# Patient Record
Sex: FEMALE | Race: WHITE
[De-identification: names, ages, dates, MRNs, and addresses within clinical notes are randomized per-mention and may not be internally consistent; named-entity substitution may affect disease eponyms.]

---

## 2023-06-20 PROBLEM — Z00.00 ENCOUNTER FOR PREVENTIVE HEALTH EXAMINATION: Status: ACTIVE | Noted: 2023-06-20

## 2023-06-29 ENCOUNTER — APPOINTMENT (OUTPATIENT)
Dept: UROLOGY | Facility: CLINIC | Age: 28
End: 2023-06-29
Payer: COMMERCIAL

## 2023-06-29 VITALS
RESPIRATION RATE: 16 BRPM | SYSTOLIC BLOOD PRESSURE: 101 MMHG | BODY MASS INDEX: 26.68 KG/M2 | HEART RATE: 90 BPM | HEIGHT: 67 IN | OXYGEN SATURATION: 97 % | WEIGHT: 170 LBS | DIASTOLIC BLOOD PRESSURE: 70 MMHG

## 2023-06-29 DIAGNOSIS — N39.0 URINARY TRACT INFECTION, SITE NOT SPECIFIED: ICD-10-CM

## 2023-06-29 PROCEDURE — 51798 US URINE CAPACITY MEASURE: CPT

## 2023-06-29 PROCEDURE — 99204 OFFICE O/P NEW MOD 45 MIN: CPT | Mod: 25

## 2023-06-29 NOTE — PHYSICAL EXAM
[General Appearance - Well Developed] : well developed [General Appearance - Well Nourished] : well nourished [Normal Appearance] : normal appearance [Well Groomed] : well groomed [General Appearance - In No Acute Distress] : no acute distress [] : no respiratory distress [Respiration, Rhythm And Depth] : normal respiratory rhythm and effort [Exaggerated Use Of Accessory Muscles For Inspiration] : no accessory muscle use [Abdomen Soft] : soft [Abdomen Tenderness] : non-tender [Costovertebral Angle Tenderness] : no ~M costovertebral angle tenderness [Urethral Meatus] : normal urethra [External Female Genitalia] : normal external genitalia [Vagina] : normal vaginal exam [Normal Station and Gait] : the gait and station were normal for the patient's age [FreeTextEntry1] : + point tenderness pelvic floor

## 2023-06-29 NOTE — ASSESSMENT
[FreeTextEntry1] : Recurrent UTI is defined as two episodes of acute bacterial cystitis within six months or three episodes within one year. Patient seems to meets criteria, but no culture data here given recently moved from . Exclusively post-coital in nature. We discussed the natural history of UTIs and strategies to prevent incidence of UTIs. We discussed the data related to increase water intake and cranberry extract daily. We discussed antibiotic prophylaxis both continuous and post-coital. Patient was on post-coital years ago and helped significantly even after completed course. Recent increase in episodes - since moved had 3 episodes but no cultures.\par \par - UA, urine culture (ensure no microhematuria given history with UTIs)\par - importance of culture data discussed - will call with symptoms\par - increase water, cranberry extract, probiotics, pt and partner showering prior to intercourse, urinating after\par - if subsequent culture proven UTI will initiate post-coital prophylaxis\par - f/u in 3 months\par - patient reports family history brain tumor and is interested in genetic counseling - will email genetics team

## 2023-06-29 NOTE — HISTORY OF PRESENT ILLNESS
[FreeTextEntry1] : Name Colleen Cardenas \par MRN [id number] \par  1995 \par Contact Number: [phone]\par -----------------------------------------------------------------------------------------------------------------------------------------\par Date of First Visit: 23\par Referring Provider/PCP: none\par -----------------------------------------------------------------------------------------------------------------------------------------\par \par CC: recurrent UTIs\par \par History of Present Illness: Colleen Cardenas is a 28 year old female who presents for evaluation recurrent UTI. Patient recently moved from  in February.\par \par Patient reports ever since she became sexually active she started getting post-coital UTIs. In the [place], every time had symptoms she had urine tested and would only be able to get antibiotics if culture positive. Gets UTIs every couple of months, but that is because she avoids sexual intercourse. In  patient was placed on prophylaxis which improved incidence, then things improved and has been off for past 3 years. Over the last year has had worse symptoms. UTIs are essentially exclusively post-coital in nature.\par \par Since moved in February she had 3 episodes of symptoms - [facility] no culture sent and was given antibiotics - nitrofurantoin. One month ago last episode. Symptoms usually resolve with antibiotics. Not currently having symptoms.\par \par Typical symptoms: dysuria, hematuria, suprapubic pain, frequency. No history of fevers, no history of flank pain, no history of stones. No known history MDR organisms. No prior  surgeries.\par \par Prior treatments: drinks 8 cups water a day, cranberry extract many years ago but only once developed symptoms, antibiotic prophylaxis, urinating after intercourse.\par \par Baseline genitourinary symptoms between infections - no issues.\par \par \par \par PVR 0\par \par PMH: none\par PSH: none\par Family History: mother with brain tumor, passed at 40\par Social: , no children, recently moved from , works in [employer], never smoker, 7 drinks/week, no recreational drugs\par Meds: birth control\par Allergies: NKDA\par ROS: no dysuria, no fever or chills

## 2023-06-30 LAB
APPEARANCE: CLEAR
BACTERIA: NEGATIVE /HPF
BILIRUBIN URINE: NEGATIVE
BLOOD URINE: NEGATIVE
CAST: 0 /LPF
COLOR: YELLOW
EPITHELIAL CELLS: 3 /HPF
GLUCOSE QUALITATIVE U: NEGATIVE MG/DL
KETONES URINE: NEGATIVE MG/DL
LEUKOCYTE ESTERASE URINE: NEGATIVE
MICROSCOPIC-UA: NORMAL
NITRITE URINE: NEGATIVE
PH URINE: 7.5
PROTEIN URINE: NEGATIVE MG/DL
RED BLOOD CELLS URINE: 2 /HPF
SPECIFIC GRAVITY URINE: 1.02
UROBILINOGEN URINE: 1 MG/DL
WHITE BLOOD CELLS URINE: 0 /HPF

## 2023-07-03 ENCOUNTER — NON-APPOINTMENT (OUTPATIENT)
Age: 28
End: 2023-07-03

## 2023-07-03 LAB — BACTERIA UR CULT: NORMAL

## 2023-07-13 ENCOUNTER — TRANSCRIPTION ENCOUNTER (OUTPATIENT)
Age: 28
End: 2023-07-13

## 2023-08-09 ENCOUNTER — APPOINTMENT (OUTPATIENT)
Dept: HEMATOLOGY ONCOLOGY | Facility: CLINIC | Age: 28
End: 2023-08-09